# Patient Record
Sex: FEMALE | Race: OTHER | NOT HISPANIC OR LATINO
[De-identification: names, ages, dates, MRNs, and addresses within clinical notes are randomized per-mention and may not be internally consistent; named-entity substitution may affect disease eponyms.]

---

## 2017-04-12 ENCOUNTER — APPOINTMENT (OUTPATIENT)
Dept: OPHTHALMOLOGY | Facility: CLINIC | Age: 67
End: 2017-04-12

## 2020-08-07 ENCOUNTER — APPOINTMENT (OUTPATIENT)
Dept: ENDOCRINOLOGY | Facility: CLINIC | Age: 70
End: 2020-08-07

## 2020-09-18 ENCOUNTER — APPOINTMENT (OUTPATIENT)
Dept: ENDOCRINOLOGY | Facility: CLINIC | Age: 70
End: 2020-09-18

## 2020-11-09 ENCOUNTER — APPOINTMENT (OUTPATIENT)
Dept: ENDOCRINOLOGY | Facility: CLINIC | Age: 70
End: 2020-11-09
Payer: SELF-PAY

## 2020-11-09 PROCEDURE — SNS01: CPT

## 2022-09-10 ENCOUNTER — EMERGENCY (EMERGENCY)
Facility: HOSPITAL | Age: 72
LOS: 1 days | Discharge: HOME | End: 2022-09-10
Attending: EMERGENCY MEDICINE | Admitting: EMERGENCY MEDICINE
Payer: MEDICARE

## 2022-09-10 VITALS
DIASTOLIC BLOOD PRESSURE: 71 MMHG | SYSTOLIC BLOOD PRESSURE: 114 MMHG | TEMPERATURE: 98 F | OXYGEN SATURATION: 100 % | HEART RATE: 98 BPM | RESPIRATION RATE: 20 BRPM

## 2022-09-10 VITALS
DIASTOLIC BLOOD PRESSURE: 69 MMHG | HEART RATE: 85 BPM | RESPIRATION RATE: 22 BRPM | OXYGEN SATURATION: 100 % | TEMPERATURE: 98 F | SYSTOLIC BLOOD PRESSURE: 127 MMHG

## 2022-09-10 DIAGNOSIS — M06.9 RHEUMATOID ARTHRITIS, UNSPECIFIED: ICD-10-CM

## 2022-09-10 DIAGNOSIS — Z20.822 CONTACT WITH AND (SUSPECTED) EXPOSURE TO COVID-19: ICD-10-CM

## 2022-09-10 DIAGNOSIS — E11.9 TYPE 2 DIABETES MELLITUS WITHOUT COMPLICATIONS: ICD-10-CM

## 2022-09-10 DIAGNOSIS — R05.9 COUGH, UNSPECIFIED: ICD-10-CM

## 2022-09-10 DIAGNOSIS — J45.901 UNSPECIFIED ASTHMA WITH (ACUTE) EXACERBATION: ICD-10-CM

## 2022-09-10 LAB
ALBUMIN SERPL ELPH-MCNC: 3.7 G/DL — SIGNIFICANT CHANGE UP (ref 3.3–5)
ALP SERPL-CCNC: 86 U/L — SIGNIFICANT CHANGE UP (ref 40–120)
ALT FLD-CCNC: 16 U/L — SIGNIFICANT CHANGE UP (ref 10–45)
ANION GAP SERPL CALC-SCNC: 10 MMOL/L — SIGNIFICANT CHANGE UP (ref 5–17)
AST SERPL-CCNC: 25 U/L — SIGNIFICANT CHANGE UP (ref 10–40)
BASOPHILS # BLD AUTO: 0.04 K/UL — SIGNIFICANT CHANGE UP (ref 0–0.2)
BASOPHILS NFR BLD AUTO: 0.5 % — SIGNIFICANT CHANGE UP (ref 0–2)
BILIRUB SERPL-MCNC: 0.3 MG/DL — SIGNIFICANT CHANGE UP (ref 0.2–1.2)
BUN SERPL-MCNC: 9 MG/DL — SIGNIFICANT CHANGE UP (ref 7–23)
CALCIUM SERPL-MCNC: 9 MG/DL — SIGNIFICANT CHANGE UP (ref 8.4–10.5)
CHLORIDE SERPL-SCNC: 105 MMOL/L — SIGNIFICANT CHANGE UP (ref 96–108)
CO2 SERPL-SCNC: 23 MMOL/L — SIGNIFICANT CHANGE UP (ref 22–31)
CREAT SERPL-MCNC: 0.78 MG/DL — SIGNIFICANT CHANGE UP (ref 0.5–1.3)
EGFR: 80 ML/MIN/1.73M2 — SIGNIFICANT CHANGE UP
EOSINOPHIL # BLD AUTO: 0.17 K/UL — SIGNIFICANT CHANGE UP (ref 0–0.5)
EOSINOPHIL NFR BLD AUTO: 2.3 % — SIGNIFICANT CHANGE UP (ref 0–6)
GLUCOSE SERPL-MCNC: 250 MG/DL — HIGH (ref 70–99)
HCT VFR BLD CALC: 35.9 % — SIGNIFICANT CHANGE UP (ref 34.5–45)
HGB BLD-MCNC: 11.6 G/DL — SIGNIFICANT CHANGE UP (ref 11.5–15.5)
IMM GRANULOCYTES NFR BLD AUTO: 0.4 % — SIGNIFICANT CHANGE UP (ref 0–1.5)
LYMPHOCYTES # BLD AUTO: 1 K/UL — SIGNIFICANT CHANGE UP (ref 1–3.3)
LYMPHOCYTES # BLD AUTO: 13.6 % — SIGNIFICANT CHANGE UP (ref 13–44)
MCHC RBC-ENTMCNC: 29.5 PG — SIGNIFICANT CHANGE UP (ref 27–34)
MCHC RBC-ENTMCNC: 32.3 GM/DL — SIGNIFICANT CHANGE UP (ref 32–36)
MCV RBC AUTO: 91.3 FL — SIGNIFICANT CHANGE UP (ref 80–100)
MONOCYTES # BLD AUTO: 0.4 K/UL — SIGNIFICANT CHANGE UP (ref 0–0.9)
MONOCYTES NFR BLD AUTO: 5.4 % — SIGNIFICANT CHANGE UP (ref 2–14)
NEUTROPHILS # BLD AUTO: 5.74 K/UL — SIGNIFICANT CHANGE UP (ref 1.8–7.4)
NEUTROPHILS NFR BLD AUTO: 77.8 % — HIGH (ref 43–77)
NRBC # BLD: 0 /100 WBCS — SIGNIFICANT CHANGE UP (ref 0–0)
PLATELET # BLD AUTO: 287 K/UL — SIGNIFICANT CHANGE UP (ref 150–400)
POTASSIUM SERPL-MCNC: 4.2 MMOL/L — SIGNIFICANT CHANGE UP (ref 3.5–5.3)
POTASSIUM SERPL-SCNC: 4.2 MMOL/L — SIGNIFICANT CHANGE UP (ref 3.5–5.3)
PROT SERPL-MCNC: 6.7 G/DL — SIGNIFICANT CHANGE UP (ref 6–8.3)
RBC # BLD: 3.93 M/UL — SIGNIFICANT CHANGE UP (ref 3.8–5.2)
RBC # FLD: 13.7 % — SIGNIFICANT CHANGE UP (ref 10.3–14.5)
SARS-COV-2 RNA SPEC QL NAA+PROBE: SIGNIFICANT CHANGE UP
SODIUM SERPL-SCNC: 138 MMOL/L — SIGNIFICANT CHANGE UP (ref 135–145)
TROPONIN T SERPL-MCNC: 0.01 NG/ML — SIGNIFICANT CHANGE UP (ref 0–0.01)
WBC # BLD: 7.38 K/UL — SIGNIFICANT CHANGE UP (ref 3.8–10.5)
WBC # FLD AUTO: 7.38 K/UL — SIGNIFICANT CHANGE UP (ref 3.8–10.5)

## 2022-09-10 PROCEDURE — 94640 AIRWAY INHALATION TREATMENT: CPT

## 2022-09-10 PROCEDURE — 99285 EMERGENCY DEPT VISIT HI MDM: CPT | Mod: 25

## 2022-09-10 PROCEDURE — 84484 ASSAY OF TROPONIN QUANT: CPT

## 2022-09-10 PROCEDURE — 87635 SARS-COV-2 COVID-19 AMP PRB: CPT

## 2022-09-10 PROCEDURE — 36415 COLL VENOUS BLD VENIPUNCTURE: CPT

## 2022-09-10 PROCEDURE — 71045 X-RAY EXAM CHEST 1 VIEW: CPT

## 2022-09-10 PROCEDURE — 93005 ELECTROCARDIOGRAM TRACING: CPT

## 2022-09-10 PROCEDURE — 93010 ELECTROCARDIOGRAM REPORT: CPT

## 2022-09-10 PROCEDURE — 85025 COMPLETE CBC W/AUTO DIFF WBC: CPT

## 2022-09-10 PROCEDURE — 71045 X-RAY EXAM CHEST 1 VIEW: CPT | Mod: 26

## 2022-09-10 PROCEDURE — 80053 COMPREHEN METABOLIC PANEL: CPT

## 2022-09-10 RX ORDER — IPRATROPIUM/ALBUTEROL SULFATE 18-103MCG
3 AEROSOL WITH ADAPTER (GRAM) INHALATION ONCE
Refills: 0 | Status: COMPLETED | OUTPATIENT
Start: 2022-09-10 | End: 2022-09-10

## 2022-09-10 RX ORDER — IPRATROPIUM/ALBUTEROL SULFATE 18-103MCG
3 AEROSOL WITH ADAPTER (GRAM) INHALATION
Qty: 30 | Refills: 0
Start: 2022-09-10 | End: 2022-10-09

## 2022-09-10 RX ADMIN — Medication 3 MILLILITER(S): at 17:11

## 2022-09-10 RX ADMIN — Medication 50 MILLIGRAM(S): at 19:10

## 2022-09-10 NOTE — ED PROVIDER NOTE - PATIENT PORTAL LINK FT
You can access the FollowMyHealth Patient Portal offered by [facility] by registering at the following website: [email or web address]. By joining [employer]’s FollowMyHealth portal, you will also be able to view your health information using other applications (apps) compatible with our system.

## 2022-09-10 NOTE — ED PROVIDER NOTE - OBJECTIVE STATEMENT
72 F ho RA, DM, asthma- presents w 3-4 days of gen fatigue, weakness, sob no n/v- started with sore throat sinus congestion gen aches  with subj fevers/chills  took covid test today- was negative- has ho asthma- has not bothered her in years- did not take asthma meds  mod severity  no ho mi/cva 72 F ho RA, DM, asthma- presents w 3-4 days of cough, gen fatigue, weakness, sob no n/v- started with sore throat sinus congestion gen aches  with subj fevers/chills  took covid test today- was negative- has ho asthma- has not bothered her in years- did not take asthma meds  mod severity  no ho mi/cva

## 2022-09-10 NOTE — ED PROVIDER NOTE - CHIEF COMPLAINT
Goal Outcome Evaluation:           Progress: improving  Outcome Evaluation: VSS. 3L NC O2. Pt complains of severe sharp aching abdominal pain throughout shift and nausea. PRN dilaudid and scheduled gabapentin and tylenol given for pain control. PRN zofran and compazine given for nausea. 200ml of blood drainage output from JUDD drain. NG to LWS. Pt ambulated in hallway. Red tinged urine output. No further complaints at this time.   The patient is a 72y Female complaining of shortness of breath.

## 2022-09-10 NOTE — ED PROVIDER NOTE - NSFOLLOWUPINSTRUCTIONS_ED_ALL_ED_FT
Asthma, Adult       Asthma is a long-term (chronic) condition that causes recurrent episodes in which the airways become tight and narrow. The airways are the passages that lead from the nose and mouth down into the lungs. Asthma episodes, also called asthma attacks, can cause coughing, wheezing, shortness of breath, and chest pain. The airways can also fill with mucus. During an attack, it can be difficult to breathe. Asthma attacks can range from minor to life threatening.    Asthma cannot be cured, but medicines and lifestyle changes can help control it and treat acute attacks.      What are the causes?    This condition is believed to be caused by inherited (genetic) and environmental factors, but its exact cause is not known.    There are many things that can bring on an asthma attack or make asthma symptoms worse (triggers). Asthma triggers are different for each person. Common triggers include:  •Mold.      •Dust.      •Cigarette smoke.      •Cockroaches.      •Things that can cause allergy symptoms (allergens), such as animal dander or pollen from trees or grass.      •Air pollutants such as household , wood smoke, smog, or chemical odors.      •Cold air, weather changes, and winds (which increase molds and pollen in the air).      •Strong emotional expressions such as crying or laughing hard.      •Stress.      •Certain medicines (such as aspirin) or types of medicines (such as beta-blockers).      •Sulfites in foods and drinks. Foods and drinks that may contain sulfites include dried fruit, potato chips, and sparkling grape juice.      •Infections or inflammatory conditions such as the flu, a cold, or inflammation of the nasal membranes (rhinitis).      •Gastroesophageal reflux disease (GERD).      •Exercise or strenuous activity.        What are the signs or symptoms?    Symptoms of this condition may occur right after asthma is triggered or many hours later. Symptoms include:  •Wheezing. This can sound like whistling when you breathe.      •Excessive nighttime or early morning coughing.      •Frequent or severe coughing with a common cold.      •Chest tightness.      •Shortness of breath.      •Tiredness (fatigue) with minimal activity.        How is this diagnosed?    This condition is diagnosed based on:  •Your medical history.      •A physical exam.    •Tests, which may include:  •Lung function studies and pulmonary studies (spirometry). These tests can evaluate the flow of air in your lungs.      •Allergy tests.      •Imaging tests, such as X-rays.          How is this treated?    There is no cure for this condition, but treatment can help control your symptoms. Treatment for asthma usually involves:  •Identifying and avoiding your asthma triggers.    •Using medicines to control your symptoms. Generally, two types of medicines are used to treat asthma:  •Controller medicines. These help prevent asthma symptoms from occurring. They are usually taken every day.      •Fast-acting reliever or rescue medicines. These quickly relieve asthma symptoms by widening the narrow and tight airways. They are used as needed and provide short-term relief.        •Using supplemental oxygen. This may be needed during a severe episode.    •Using other medicines, such as:  •Allergy medicines, such as antihistamines, if your asthma attacks are triggered by allergens.      •Immune medicines (immunomodulators). These are medicines that help control the immune system.      •Creating an asthma action plan. An asthma action plan is a written plan for managing and treating your asthma attacks. This plan includes:  •A list of your asthma triggers and how to avoid them.      •Information about when medicines should be taken and when their dosage should be changed.      •Instructions about using a device called a peak flow meter. A peak flow meter measures how well the lungs are working and the severity of your asthma. It helps you monitor your condition.          Follow these instructions at home:    Controlling your home environment     Control your home environment in the following ways to help avoid triggers and prevent asthma attacks:  •Change your heating and air conditioning filter regularly.      •Limit your use of fireplaces and wood stoves.      •Get rid of pests (such as roaches and mice) and their droppings.      •Throw away plants if you see mold on them.      •Clean floors and dust surfaces regularly. Use unscented cleaning products.      •Try to have someone else vacuum for you regularly. Stay out of rooms while they are being vacuumed and for a short while afterward. If you vacuum, use a dust mask from a hardware store, a double-layered or microfilter vacuum  bag, or a vacuum  with a HEPA filter.      •Replace carpet with wood, tile, or vinyl asya. Carpet can trap dander and dust.      •Use allergy-proof pillows, mattress covers, and box spring covers.      •Keep your bedroom a trigger-free room.       •Avoid pets and keep windows closed when allergens are in the air.      •Wash beddings every week in hot water and dry them in a dryer.      •Use blankets that are made of polyester or cotton.      •Clean bathrooms and farhan with bleach. If possible, have someone repaint the walls in these rooms with mold-resistant paint. Stay out of the rooms that are being cleaned and painted.      •Wash your hands often with soap and water. If soap and water are not available, use hand .      •Do not allow anyone to smoke in your home.      General instructions  •Take over-the-counter and prescription medicines only as told by your health care provider.  •Speak with your health care provider if you have questions about how or when to take the medicines.      •Make note if you are requiring more frequent dosages.        •Do not use any products that contain nicotine or tobacco, such as cigarettes and e-cigarettes. If you need help quitting, ask your health care provider. Also, avoid being exposed to secondhand smoke.      •Use a peak flow meter as told by your health care provider. Record and keep track of the readings.      •Understand and use the asthma action plan to help minimize, or stop an asthma attack, without needing to seek medical care.      •Make sure you stay up to date on your yearly vaccinations as told by your health care provider. This may include vaccines for the flu and pneumonia.      •Avoid outdoor activities when allergen counts are high and when air quality is low.      •Wear a ski mask that covers your nose and mouth during outdoor winter activities. Exercise indoors on cold days if you can.      •Warm up before exercising, and take time for a cool-down period after exercise.      •Keep all follow-up visits as told by your health care provider. This is important.        Where to find more information    •For information about asthma, turn to the Centers for Disease Control and Prevention at www.cdc.gov/asthma/faqs      •For air quality information, turn to [employer] at airnow.gov        Contact a health care provider if:    •You have wheezing, shortness of breath, or a cough even while you are taking medicine to prevent attacks.      •The mucus you cough up (sputum) is thicker than usual.      •Your sputum changes from clear or white to yellow, green, gray, or bloody.      •Your medicines are causing side effects, such as a rash, itching, swelling, or trouble breathing.      •You need to use a reliever medicine more than 2–3 times a week.      •Your peak flow reading is still at 50–79% of your personal best after following your action plan for 1 hour.      •You have a fever.        Get help right away if:    •You are getting worse and do not respond to treatment during an asthma attack.      •You are short of breath when at rest or when doing very little physical activity.      •You have difficulty eating, drinking, or talking.      •You have chest pain or tightness.      •You develop a fast heartbeat or palpitations.      •You have a bluish color to your lips or fingernails.      •You are light-headed or dizzy, or you faint.      •Your peak flow reading is less than 50% of your personal best.      •You feel too tired to breathe normally.        Summary    •Asthma is a long-term (chronic) condition that causes recurrent episodes in which the airways become tight and narrow. These episodes can cause coughing, wheezing, shortness of breath, and chest pain.      •Asthma cannot be cured, but medicines and lifestyle changes can help control it and treat acute attacks.      •Make sure you understand how to avoid triggers and how and when to use your medicines.      •Asthma attacks can range from minor to life threatening. Get help right away if you have an asthma attack and do not respond to treatment with your usual rescue medicines.      This information is not intended to replace advice given to you by your health care provider. Make sure you discuss any questions you have with your health care provider.      Document Revised: 09/17/2021 Document Reviewed: 04/21/2021    [employer] Patient Education © 2022 [employer]

## 2022-09-10 NOTE — ED ADULT TRIAGE NOTE - CHIEF COMPLAINT QUOTE
Pt reports sob, chest tightness since Tuesday. Hx of DM, RA, asthma with no prior intubations. Seen by [facility], tested covid negative and flu negative today. Denies fevers at home.

## 2022-09-10 NOTE — ED ADULT NURSE NOTE - OBJECTIVE STATEMENT
Pt A&Ox4, ambulatory with steady gait, speaking in clear/full sentences, no acute distress, vital signs stable. Pt presents to the ED for chest tightness, cough sore throat since Tuesday with some low grade fevers at home. No CP/ N/V diarrhea.

## 2022-09-10 NOTE — ED ADULT NURSE NOTE - CHIEF COMPLAINT QUOTE
Pt reports sob, chest tightness since Tuesday. Hx of DM, RA, asthma with no prior intubations. Seen by [facility], tested covid negative and flu negative today. Denies fevers at home.

## 2022-09-10 NOTE — ED PROVIDER NOTE - CLINICAL SUMMARY MEDICAL DECISION MAKING FREE TEXT BOX
flu like sx- neg swab- but sent in for further eval flu like sx- neg swab- but sent in for further eval- no pneumonia- sx improved after neb- d/c home - steroid burst x 5 days, nebs 2-3 times a day  si/sx to return d/w px

## 2022-09-10 NOTE — ED ADULT NURSE NOTE - NSIMPLEMENTINTERV_GEN_ALL_ED
Implemented All Universal Safety Interventions:  [place] to call system. Call bell, personal items and telephone within reach. Instruct patient to call for assistance. Room bathroom lighting operational. Non-slip footwear when patient is off stretcher. Physically safe environment: no spills, clutter or unnecessary equipment. Stretcher in lowest position, wheels locked, appropriate side rails in place.